# Patient Record
Sex: FEMALE | Race: BLACK OR AFRICAN AMERICAN | NOT HISPANIC OR LATINO | Employment: FULL TIME | URBAN - METROPOLITAN AREA
[De-identification: names, ages, dates, MRNs, and addresses within clinical notes are randomized per-mention and may not be internally consistent; named-entity substitution may affect disease eponyms.]

---
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
## 2018-01-16 ENCOUNTER — HOSPITAL ENCOUNTER (EMERGENCY)
Facility: OTHER | Age: 18
Discharge: HOME | End: 2018-01-16
Attending: EMERGENCY MEDICINE
Payer: MEDICAID

## 2018-01-16 VITALS
SYSTOLIC BLOOD PRESSURE: 122 MMHG | HEIGHT: 62 IN | RESPIRATION RATE: 18 BRPM | HEART RATE: 88 BPM | OXYGEN SATURATION: 100 % | DIASTOLIC BLOOD PRESSURE: 62 MMHG | TEMPERATURE: 99 F | BODY MASS INDEX: 25.21 KG/M2 | WEIGHT: 137 LBS

## 2018-01-16 DIAGNOSIS — R52 PAIN: ICD-10-CM

## 2018-01-16 DIAGNOSIS — S90.112A CONTUSION OF LEFT GREAT TOE WITHOUT DAMAGE TO NAIL, INITIAL ENCOUNTER: Primary | ICD-10-CM

## 2018-01-16 LAB
B-HCG UR QL: NEGATIVE
CTP QC/QA: YES

## 2018-01-16 PROCEDURE — 81025 URINE PREGNANCY TEST: CPT | Performed by: EMERGENCY MEDICINE

## 2018-01-16 PROCEDURE — 25000003 PHARM REV CODE 250: Performed by: EMERGENCY MEDICINE

## 2018-01-16 PROCEDURE — 99284 EMERGENCY DEPT VISIT MOD MDM: CPT | Mod: 25

## 2018-01-16 RX ORDER — IBUPROFEN 600 MG/1
600 TABLET ORAL
Status: COMPLETED | OUTPATIENT
Start: 2018-01-16 | End: 2018-01-16

## 2018-01-16 RX ORDER — TRAMADOL HYDROCHLORIDE 50 MG/1
50 TABLET ORAL EVERY 8 HOURS PRN
Qty: 8 TABLET | Refills: 0 | Status: SHIPPED | OUTPATIENT
Start: 2018-01-16 | End: 2018-01-26

## 2018-01-16 RX ADMIN — IBUPROFEN 600 MG: 600 TABLET, FILM COATED ORAL at 01:01

## 2018-01-16 NOTE — ED PROVIDER NOTES
"Encounter Date: 1/16/2018       History     Chief Complaint   Patient presents with    Toe Pain      Pt states, " I spang my big toe two days ago."     A 18-year-old female who presents to the ED with complaints of left great toe pain.  Patient states she hit her left great toe on the bed 2-3 days ago.  Patient concerned about increasing pain in bruising.  Patient has been taken ibuprofen 200 mg.  Patient takes she is being tested for renal insufficiency.      The history is provided by the patient.   Toe Pain   This is a new problem. The current episode started more than 2 days ago. The problem has been gradually worsening. Pertinent negatives include no chest pain and no shortness of breath. The symptoms are aggravated by walking. The symptoms are relieved by NSAIDs. The treatment provided no relief.     Review of patient's allergies indicates:  No Known Allergies  Past Medical History:   Diagnosis Date    Seasonal allergies      Past Surgical History:   Procedure Laterality Date    MIDDLE EAR SURGERY      RENAL BIOPSY      TYMPANOSTOMY TUBE PLACEMENT       History reviewed. No pertinent family history.  Social History   Substance Use Topics    Smoking status: Current Every Day Smoker     Types: Vaping with nicotine    Smokeless tobacco: Never Used    Alcohol use No     Review of Systems   Constitutional: Negative for fever.   HENT: Negative for sore throat.    Respiratory: Negative for shortness of breath.    Cardiovascular: Negative for chest pain.   Gastrointestinal: Negative for nausea.   Genitourinary: Negative for dysuria.   Musculoskeletal: Negative for back pain.        Left great toe pain   Skin: Negative for rash.   Neurological: Negative for weakness.   Hematological: Does not bruise/bleed easily.   All other systems reviewed and are negative.      Physical Exam     Initial Vitals [01/16/18 1228]   BP Pulse Resp Temp SpO2   (!) 113/54 82 16 98.2 °F (36.8 °C) 100 %      MAP       73.67     "     Physical Exam    Nursing note and vitals reviewed.  Constitutional: Vital signs are normal. She appears well-developed. She is cooperative. She does not appear ill.   HENT:   Head: Normocephalic and atraumatic.   Right Ear: External ear normal.   Left Ear: External ear normal.   Nose: Nose normal.   Mouth/Throat: Oropharynx is clear and moist.   Eyes: Conjunctivae and lids are normal. Pupils are equal, round, and reactive to light.   Neck: Normal range of motion. Neck supple.   Cardiovascular: Normal rate, regular rhythm, normal heart sounds and intact distal pulses.   Pulmonary/Chest: Effort normal and breath sounds normal.   Abdominal: Soft. Normal appearance and bowel sounds are normal. There is no tenderness.   Musculoskeletal:        Left foot: There is decreased range of motion and tenderness. There is no bony tenderness.        Feet:    Neurological: She is alert and oriented to person, place, and time.   Skin: Skin is warm, dry and intact. Capillary refill takes less than 2 seconds. No rash noted.   Psychiatric: She has a normal mood and affect. Her behavior is normal. Judgment and thought content normal. Cognition and memory are normal.         ED Course   Procedures  Labs Reviewed   POCT URINE PREGNANCY        Imaging Results          X-Ray Foot Complete Left (Final result)  Result time 01/16/18 13:27:34    Final result by Valdo Julien MD (01/16/18 13:27:34)                 Impression:        As above.      Electronically signed by: VALDO JULIEN MD  Date:     01/16/18  Time:    13:27              Narrative:    History: Foot pain.    Procedure: left foot 3 views    Findings     There are no acute fractures or dislocations. Soft tissues are unremarkable. No radiopaque foreign bodies in the soft tissues.                                  Medical Decision Making:   Initial Assessment:   A 18-year-old female who presents to the ED with complaints of left great toe pain.  Patient states she hit her toe on  the bed 2-3 days ago.  Patient reports increased pain with bruising.  Differential Diagnosis:   Toe contusion  Toe fracture   Clinical Tests:   Radiological Study: Ordered and Reviewed  ED Management:  Physical exam.  Patient medicated with Motrin.  Patient placed in a postop shoe.  Patient to be discharged home with tramadol when necessary.  Tylenol when necessary pain.  Patient to follow-up with PCP in 2-3 days.  Return to ED if symptoms worsen.                   ED Course      Clinical Impression:   The primary encounter diagnosis was Contusion of left great toe without damage to nail, initial encounter. A diagnosis of Pain was also pertinent to this visit.                           LEONA Barragan  01/16/18 1444       LEONA Barragan  01/16/18 1443

## 2020-04-23 ENCOUNTER — HOSPITAL ENCOUNTER (EMERGENCY)
Facility: HOSPITAL | Age: 20
Discharge: HOME | End: 2020-04-23
Attending: EMERGENCY MEDICINE
Payer: MEDICAID

## 2020-04-23 VITALS
HEART RATE: 68 BPM | BODY MASS INDEX: 23.55 KG/M2 | DIASTOLIC BLOOD PRESSURE: 73 MMHG | RESPIRATION RATE: 16 BRPM | TEMPERATURE: 98 F | SYSTOLIC BLOOD PRESSURE: 112 MMHG | OXYGEN SATURATION: 100 % | WEIGHT: 128 LBS | HEIGHT: 62 IN

## 2020-04-23 DIAGNOSIS — N89.8 VAGINAL DISCHARGE: ICD-10-CM

## 2020-04-23 DIAGNOSIS — Z20.2 POSSIBLE EXPOSURE TO STD: Primary | ICD-10-CM

## 2020-04-23 LAB
B-HCG UR QL: NEGATIVE
BILIRUBIN, POC UA: ABNORMAL
BLOOD, POC UA: ABNORMAL
CLARITY, POC UA: ABNORMAL
COLOR, POC UA: ABNORMAL
CTP QC/QA: YES
GLUCOSE, POC UA: NEGATIVE
KETONES, POC UA: ABNORMAL
LEUKOCYTE EST, POC UA: NEGATIVE
NITRITE, POC UA: NEGATIVE
PH UR STRIP: 6 [PH]
PROTEIN, POC UA: ABNORMAL
SPECIFIC GRAVITY, POC UA: >=1.03
UROBILINOGEN, POC UA: 0.2 E.U./DL

## 2020-04-23 PROCEDURE — 63700000 PHARM REV CODE 250 ALT 637 W/O HCPCS: Mod: ER | Performed by: NURSE PRACTITIONER

## 2020-04-23 PROCEDURE — 81003 URINALYSIS AUTO W/O SCOPE: CPT | Mod: ER

## 2020-04-23 PROCEDURE — 96372 THER/PROPH/DIAG INJ SC/IM: CPT | Mod: ER

## 2020-04-23 PROCEDURE — 87491 CHLMYD TRACH DNA AMP PROBE: CPT | Mod: 59

## 2020-04-23 PROCEDURE — 87801 DETECT AGNT MULT DNA AMPLI: CPT

## 2020-04-23 PROCEDURE — 25000003 PHARM REV CODE 250: Mod: ER | Performed by: NURSE PRACTITIONER

## 2020-04-23 PROCEDURE — 99284 EMERGENCY DEPT VISIT MOD MDM: CPT | Mod: 25,ER

## 2020-04-23 PROCEDURE — 87481 CANDIDA DNA AMP PROBE: CPT | Mod: 59

## 2020-04-23 PROCEDURE — 81025 URINE PREGNANCY TEST: CPT | Mod: ER | Performed by: NURSE PRACTITIONER

## 2020-04-23 PROCEDURE — 63600175 PHARM REV CODE 636 W HCPCS: Mod: ER | Performed by: NURSE PRACTITIONER

## 2020-04-23 RX ORDER — ONDANSETRON 4 MG/1
4 TABLET, ORALLY DISINTEGRATING ORAL
Status: COMPLETED | OUTPATIENT
Start: 2020-04-23 | End: 2020-04-23

## 2020-04-23 RX ORDER — METRONIDAZOLE 500 MG/1
500 TABLET ORAL 2 TIMES DAILY
Qty: 14 TABLET | Refills: 0 | Status: SHIPPED | OUTPATIENT
Start: 2020-04-23 | End: 2020-04-30

## 2020-04-23 RX ORDER — CEFTRIAXONE 250 MG/1
250 INJECTION, POWDER, FOR SOLUTION INTRAMUSCULAR; INTRAVENOUS
Status: COMPLETED | OUTPATIENT
Start: 2020-04-23 | End: 2020-04-23

## 2020-04-23 RX ORDER — AZITHROMYCIN 250 MG/1
1000 TABLET, FILM COATED ORAL
Status: COMPLETED | OUTPATIENT
Start: 2020-04-23 | End: 2020-04-23

## 2020-04-23 RX ORDER — ONDANSETRON 4 MG/1
4 TABLET, ORALLY DISINTEGRATING ORAL EVERY 8 HOURS PRN
Qty: 20 TABLET | Refills: 0 | OUTPATIENT
Start: 2020-04-23 | End: 2021-12-31

## 2020-04-23 RX ADMIN — AZITHROMYCIN MONOHYDRATE 1000 MG: 250 TABLET ORAL at 03:04

## 2020-04-23 RX ADMIN — CEFTRIAXONE SODIUM 250 MG: 250 INJECTION, POWDER, FOR SOLUTION INTRAMUSCULAR; INTRAVENOUS at 03:04

## 2020-04-23 RX ADMIN — ONDANSETRON 4 MG: 4 TABLET, ORALLY DISINTEGRATING ORAL at 03:04

## 2020-04-23 NOTE — ED PROVIDER NOTES
Encounter Date: 4/23/2020    SCRIBE #1 NOTE: I, Colin Felix, am scribing for, and in the presence of,  LEONA Hauser. I have scribed the following portions of the note - Other sections scribed: HPI, ROS, PE.       History     Chief Complaint   Patient presents with    Exposure to STD     states she gets tested frequently but has been unable to get in to get tested. Denies any symptoms      20 year old female presents to the ED with possible STD exposure. She reports that she recently had unprotected sex. States she usually gets tested frequently, but has been unable to recently due to her physician's office being closed. Patient denies fever, fatigue, chest pain, shortness of breath, abdominal pain, nausea, vomiting, diarrhea, dysuria, hematuria, rash, numbness, weakness, tingling, or any additional complaints.  She denies pain at present and has not tried any medications for her symptoms.      The history is provided by the patient. No  was used.     Review of patient's allergies indicates:  No Known Allergies  Past Medical History:   Diagnosis Date    Seasonal allergies      Past Surgical History:   Procedure Laterality Date    MIDDLE EAR SURGERY      RENAL BIOPSY      TYMPANOSTOMY TUBE PLACEMENT       No family history on file.  Social History     Tobacco Use    Smoking status: Current Every Day Smoker     Types: Vaping with nicotine    Smokeless tobacco: Never Used   Substance Use Topics    Alcohol use: No    Drug use: Not on file     Review of Systems   Constitutional: Negative for chills, fatigue and fever.   HENT: Negative for congestion, ear pain, rhinorrhea, sore throat and trouble swallowing.    Eyes: Negative for pain, discharge and redness.   Respiratory: Negative for cough and shortness of breath.    Cardiovascular: Negative for chest pain.   Gastrointestinal: Negative for abdominal pain, diarrhea, nausea and vomiting.   Genitourinary: Negative for decreased urine volume,  dysuria and hematuria.   Musculoskeletal: Negative for back pain, neck pain and neck stiffness.   Skin: Negative for rash.   Neurological: Negative for dizziness, weakness, light-headedness, numbness and headaches.   Psychiatric/Behavioral: Negative for confusion.       Physical Exam     Initial Vitals [04/23/20 1440]   BP Pulse Resp Temp SpO2   117/77 (!) 114 16 98.7 °F (37.1 °C) 100 %      MAP       --         Physical Exam    Nursing note and vitals reviewed.  Constitutional: Vital signs are normal. She appears well-developed and well-nourished.  Non-toxic appearance. She does not appear ill.   HENT:   Head: Normocephalic and atraumatic.   Right Ear: Tympanic membrane and external ear normal.   Left Ear: Tympanic membrane and external ear normal.   Nose: Nose normal.   Mouth/Throat: Uvula is midline, oropharynx is clear and moist and mucous membranes are normal.   Eyes: Conjunctivae are normal.   Neck: Normal range of motion. Neck supple.   Cardiovascular: Normal rate, regular rhythm and normal heart sounds. Exam reveals no gallop and no friction rub.    No murmur heard.  Pulmonary/Chest: Effort normal and breath sounds normal. No respiratory distress. She has no wheezes. She has no rhonchi. She has no rales. She exhibits no tenderness.   Abdominal: Soft. Bowel sounds are normal. There is no tenderness. There is no rebound, no guarding and no CVA tenderness.   Genitourinary: Pelvic exam was performed with patient supine. There is no rash or tenderness on the right labia. There is no rash or tenderness on the left labia. Cervix exhibits motion tenderness and discharge. Right adnexum displays no mass and no tenderness. Left adnexum displays no mass and no tenderness. Vaginal discharge found.   Genitourinary Comments: Scant discharge in the cervix and vaginal vault with CMT, no adnexal mass or tenderness, no rash or lesions   Musculoskeletal: Normal range of motion.   Neurological: She is alert and oriented to  person, place, and time. Gait normal. GCS eye subscore is 4. GCS verbal subscore is 5. GCS motor subscore is 6.   Skin: Skin is warm, dry and intact. No rash noted.   Psychiatric: She has a normal mood and affect. Her speech is normal and behavior is normal. Judgment and thought content normal.         ED Course   Procedures  Labs Reviewed   POCT URINALYSIS W/O SCOPE - Abnormal; Notable for the following components:       Result Value    Bilirubin, UA 1+ (*)     Ketones, UA Trace (*)     Spec Grav UA >=1.030 (*)     Blood, UA 2+ (*)     Protein, UA 3+ (*)     All other components within normal limits   C. TRACHOMATIS/N. GONORRHOEAE BY AMP DNA   VAGINOSIS SCREEN BY DNA PROBE   POCT URINE PREGNANCY   POCT URINALYSIS W/O SCOPE          Imaging Results    None          Medical Decision Making:   History:   Old Medical Records: I decided to obtain old medical records.  Clinical Tests:   Lab Tests: Ordered and Reviewed  The following lab test(s) were unremarkable: UPT       APC / Resident Notes:   This is an evaluation of a 20 y.o. female that presents to the Emergency Department for STD exposure    Physical Exam shows a non-toxic, afebrile, and well appearing female. Scant discharge in the cervix and vaginal vault with CMT, no adnexal mass or tenderness, no rash or lesions    Vital signs are reassuring. If available, previous records reviewed.   RESULTS: UPT negative, UA negative for infection, GC/CT pending, Vaginal screen pending    My overall impression is STD exposure. I considered, but at this time, do not suspect pregnancy, ectopic pregnancy, trich, TOA, torsion.    ED Course: UA, UPT, GC/CT, vaginal screen, rocephin, azithromycin, zofran. D/C Meds: Flagyl, zofran. D/C Information: f/u, medications. The diagnosis, treatment plan, instructions for follow-up and reevaluation with PCP as well as ED return precautions were discussed and understanding was verbalized. All questions or concerns have been addressed.             Scribe Attestation:   Scribe #1: I performed the above scribed service and the documentation accurately describes the services I performed. I attest to the accuracy of the note.    Physician Attestation for Scribe:  Physician Attestation Statement for Scribe: I, LEONA Hauser, reviewed documentation, as scribed by Colin Felix in my presence, and it is both accurate and complete.                      Clinical Impression:     1. Possible exposure to STD    2. Vaginal discharge      Disposition:   Disposition: Discharged  Condition: Stable                        LEONA Carranza  04/23/20 1527

## 2020-04-24 LAB
C TRACH DNA SPEC QL NAA+PROBE: DETECTED
N GONORRHOEA DNA SPEC QL NAA+PROBE: NOT DETECTED

## 2020-04-27 LAB
BACTERIAL VAGINOSIS DNA: POSITIVE
CANDIDA GLABRATA DNA: NEGATIVE
CANDIDA KRUSEI DNA: NEGATIVE
CANDIDA RRNA VAG QL PROBE: NEGATIVE
T VAGINALIS RRNA GENITAL QL PROBE: NEGATIVE

## 2021-05-13 ENCOUNTER — HOSPITAL ENCOUNTER (EMERGENCY)
Facility: HOSPITAL | Age: 21
Discharge: HOME | End: 2021-05-13
Attending: INTERNAL MEDICINE
Payer: MEDICAID

## 2021-05-13 VITALS
BODY MASS INDEX: 24.45 KG/M2 | HEIGHT: 63 IN | WEIGHT: 138 LBS | RESPIRATION RATE: 18 BRPM | SYSTOLIC BLOOD PRESSURE: 121 MMHG | OXYGEN SATURATION: 99 % | TEMPERATURE: 99 F | DIASTOLIC BLOOD PRESSURE: 74 MMHG | HEART RATE: 75 BPM
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
## 2021-05-13 LAB
B-HCG UR QL: NEGATIVE
CTP QC/QA: YES

## 2021-05-13 PROCEDURE — 99284 EMERGENCY DEPT VISIT MOD MDM: CPT | Mod: 25,ER

## 2021-05-13 PROCEDURE — 25000003 PHARM REV CODE 250: Mod: ER | Performed by: INTERNAL MEDICINE

## 2021-05-13 PROCEDURE — 81025 URINE PREGNANCY TEST: CPT | Mod: ER | Performed by: INTERNAL MEDICINE

## 2021-05-13 RX ORDER — FAMOTIDINE 20 MG/1
20 TABLET, FILM COATED ORAL 2 TIMES DAILY
COMMUNITY
Start: 2021-02-28 | End: 2021-12-31

## 2021-05-13 RX ORDER — METHOCARBAMOL 750 MG/1
1500 TABLET, FILM COATED ORAL
Status: COMPLETED | OUTPATIENT
Start: 2021-05-13 | End: 2021-05-13

## 2021-05-13 RX ORDER — METHOCARBAMOL 750 MG/1
1500 TABLET, FILM COATED ORAL 3 TIMES DAILY
Qty: 30 TABLET | Refills: 0 | Status: SHIPPED | OUTPATIENT
Start: 2021-05-13 | End: 2021-05-13 | Stop reason: SDUPTHER

## 2021-05-13 RX ORDER — KETOROLAC TROMETHAMINE 30 MG/ML
60 INJECTION, SOLUTION INTRAMUSCULAR; INTRAVENOUS
Status: DISCONTINUED | OUTPATIENT
Start: 2021-05-13 | End: 2021-05-14 | Stop reason: HOSPADM

## 2021-05-13 RX ORDER — LOSARTAN POTASSIUM 50 MG/1
50 TABLET ORAL
COMMUNITY
Start: 2021-02-04

## 2021-05-13 RX ORDER — METHOCARBAMOL 750 MG/1
1500 TABLET, FILM COATED ORAL 3 TIMES DAILY
Qty: 30 TABLET | Refills: 0 | Status: SHIPPED | OUTPATIENT
Start: 2021-05-13 | End: 2021-05-18

## 2021-05-13 RX ORDER — IBUPROFEN 800 MG/1
800 TABLET ORAL EVERY 8 HOURS PRN
Qty: 30 TABLET | Refills: 0 | Status: SHIPPED | OUTPATIENT
Start: 2021-05-13 | End: 2021-05-13 | Stop reason: SINTOL

## 2021-05-13 RX ADMIN — METHOCARBAMOL 1500 MG: 750 TABLET ORAL at 10:05

## 2021-12-31 ENCOUNTER — HOSPITAL ENCOUNTER (EMERGENCY)
Facility: HOSPITAL | Age: 21
Discharge: HOME | End: 2021-12-31
Attending: EMERGENCY MEDICINE
Payer: MEDICAID

## 2021-12-31 VITALS
HEART RATE: 82 BPM | SYSTOLIC BLOOD PRESSURE: 129 MMHG | BODY MASS INDEX: 24.8 KG/M2 | HEIGHT: 63 IN | OXYGEN SATURATION: 99 % | RESPIRATION RATE: 18 BRPM | DIASTOLIC BLOOD PRESSURE: 68 MMHG | TEMPERATURE: 99 F | WEIGHT: 140 LBS

## 2021-12-31 DIAGNOSIS — U07.1 COVID-19: Primary | ICD-10-CM

## 2021-12-31 LAB
CTP QC/QA: YES
SARS-COV-2 RDRP RESP QL NAA+PROBE: POSITIVE

## 2021-12-31 PROCEDURE — U0002 COVID-19 LAB TEST NON-CDC: HCPCS | Performed by: PHYSICIAN ASSISTANT

## 2021-12-31 PROCEDURE — 99284 EMERGENCY DEPT VISIT MOD MDM: CPT | Mod: 25

## 2021-12-31 PROCEDURE — 25000003 PHARM REV CODE 250: Performed by: PHYSICIAN ASSISTANT

## 2021-12-31 RX ORDER — GUAIFENESIN/DEXTROMETHORPHAN 100-10MG/5
10 SYRUP ORAL 4 TIMES DAILY PRN
Qty: 120 ML | Refills: 0 | Status: SHIPPED | OUTPATIENT
Start: 2021-12-31 | End: 2022-01-10

## 2021-12-31 RX ORDER — ONDANSETRON 4 MG/1
4 TABLET, ORALLY DISINTEGRATING ORAL
Status: COMPLETED | OUTPATIENT
Start: 2021-12-31 | End: 2021-12-31

## 2021-12-31 RX ORDER — FAMOTIDINE 20 MG/1
20 TABLET, FILM COATED ORAL 2 TIMES DAILY
Qty: 28 TABLET | Refills: 0 | Status: SHIPPED | OUTPATIENT
Start: 2021-12-31 | End: 2022-01-14

## 2021-12-31 RX ORDER — ONDANSETRON 4 MG/1
4 TABLET, ORALLY DISINTEGRATING ORAL EVERY 6 HOURS PRN
Qty: 20 TABLET | Refills: 0 | Status: SHIPPED | OUTPATIENT
Start: 2021-12-31

## 2021-12-31 RX ADMIN — ONDANSETRON 4 MG: 4 TABLET, ORALLY DISINTEGRATING ORAL at 08:12

## 2021-12-31 NOTE — Clinical Note
"Doroteo ROMERO "Bharathi Bill was seen and treated in our emergency department on 12/31/2021.     COVID-19 is present in our communities across the state. There is limited testing for COVID at this time, so not all patients can be tested. In this situation, your employee meets the following criteria:    Doroteo Bill has met the criteria for COVID-19 testing and has a POSITIVE result. She can return to work once they are asymptomatic for 72 hours without the use of fever reducing medications AND at least ten days from the first positive result.     If you have any questions or concerns, or if I can be of further assistance, please do not hesitate to contact me.    Sincerely,             Timbo Antony PA-C"

## 2022-01-01 NOTE — DISCHARGE INSTRUCTIONS
Zofran to help with nausea.  Pepcid twice daily.  Drink lots of fluids, stay well hydrated. Drink lots of fluids, stay well hydrated. Tylenol (650mg) / Ibuprofen (600mg) as needed for discomfort; go back and forth between these two medications every 4 hrs as needed for temp greater than or equal to 100.4F, as needed for congestion/headache/body aches.  Robitussin for cough.    Follow-up with a local primary care provider for reevaluation, further recommendations. Return to this ED if unable to treat fever, if symptoms persist or worsen despite treatment, if you begin with shortness of breath or difficulty breathing, if any other problems occur.

## 2022-01-01 NOTE — ED PROVIDER NOTES
Encounter Date: 12/31/2021       History     Chief Complaint   Patient presents with    COVID-19 Concerns     COVID exposure at home, c/o headaches with nausea/vomiting and body aches x 2 days, pt had home COVID + test     22yo F with pmh HTN, eczema, proteinuria, chronic glomerulonephritis, chronic back pain, with chief complaint 5d hx headache, myalgias, fatigue, cough, generally feeling unwell, began with n/v x2-3d.  Anorexia times 2-3 days.  No fever today.  Admits to positive COVID home test.  Mild generalized abdominal pain.  No urinary complaints.  Symptoms are acute, constant, mild to moderate.  No alleviating factors.  No radiation of symptoms.  No shortness of breath.        Review of patient's allergies indicates:   Allergen Reactions    Aspirin Other (See Comments)     Reports medication makes her weak and light headed    Ibuprofen Other (See Comments)     Reports medication makes her weak and light headed     Tylenol [acetaminophen] Other (See Comments)     Reports medication makes her weak and light headed      Past Medical History:   Diagnosis Date    Hypertension     Proteinuria     Renal disorder     Seasonal allergies      Past Surgical History:   Procedure Laterality Date    MIDDLE EAR SURGERY      RENAL BIOPSY      TYMPANOSTOMY TUBE PLACEMENT       No family history on file.  Social History     Tobacco Use    Smoking status: Current Every Day Smoker     Types: Vaping with nicotine    Smokeless tobacco: Never Used   Substance Use Topics    Alcohol use: No     Review of Systems   Constitutional: Positive for appetite change, fatigue and fever.   Respiratory: Positive for cough. Negative for shortness of breath.    Gastrointestinal: Positive for abdominal pain, nausea and vomiting.   Genitourinary: Negative for dysuria and flank pain.   Musculoskeletal: Positive for myalgias. Negative for neck pain and neck stiffness.       Physical Exam     Initial Vitals [12/31/21 1907]   BP Pulse  Resp Temp SpO2   123/82 78 16 98.6 °F (37 °C) 99 %      MAP       --         Physical Exam    Nursing note and vitals reviewed.  Constitutional: She appears well-developed and well-nourished. She is not diaphoretic. No distress.   Uncomfortable appearing, nontoxic.  Sitting upright in wheelchair.   HENT:   Head: Normocephalic and atraumatic.   Neck: Neck supple.   Normal range of motion.  Pulmonary/Chest: No respiratory distress.   Musculoskeletal:      Cervical back: Normal range of motion and neck supple.     Neurological: She is alert and oriented to person, place, and time. GCS score is 15. GCS eye subscore is 4. GCS verbal subscore is 5. GCS motor subscore is 6.   Skin: Skin is warm.   Psychiatric: Thought content normal.   Anxious         ED Course   Procedures  Labs Reviewed   SARS-COV-2 RDRP GENE - Abnormal; Notable for the following components:       Result Value    POC Rapid COVID Positive (*)     All other components within normal limits   POCT URINE PREGNANCY          Imaging Results    None          Medications   ondansetron disintegrating tablet 4 mg (has no administration in time range)     Medical Decision Making:   Differential Diagnosis:   Viral illness, pneumonia, gastroenteritis, GERD, gastritis  ED Management:  Will treat supportively with antibiotics, antitussives, advised lots of fluids, follow-up with PCP for re-evaluation should symptoms persist.  Return precautions given.                      Clinical Impression:   Final diagnoses:  [U07.1] COVID-19 (Primary)          ED Disposition Condition    Discharge Stable        ED Prescriptions     Medication Sig Dispense Start Date End Date Auth. Provider    ondansetron (ZOFRAN-ODT) 4 MG TbDL Take 1 tablet (4 mg total) by mouth every 6 (six) hours as needed (Nausea). 20 tablet 12/31/2021  Timbo Antony PA-C    dextromethorphan-guaiFENesin  mg/5 ml (ROBITUSSIN-DM)  mg/5 mL liquid Take 10 mLs by mouth 4 (four) times daily as needed  (Cough). 120 mL 12/31/2021 1/10/2022 Timbo Antony PA-C    famotidine (PEPCID) 20 MG tablet Take 1 tablet (20 mg total) by mouth 2 (two) times daily. for 14 days 28 tablet 12/31/2021 1/14/2022 Timbo Antony PA-C        Follow-up Information     Follow up With Specialties Details Why Contact Info    [facility]  Schedule an appointment as soon as possible for a visit  To establish primary care physician, for reevaluation [address] [place] [address]  [phone]      [facility]  Schedule an appointment as soon as possible for a visit  To establish primary care physician, For reevaluation [address] [facility] [address]  [phone]             Timbo Antony PA-C  12/31/21 1959

## 2022-12-08 ENCOUNTER — HOSPITAL ENCOUNTER (EMERGENCY)
Facility: HOSPITAL | Age: 22
Discharge: HOME | End: 2022-12-08
Attending: EMERGENCY MEDICINE
Payer: MEDICAID

## 2022-12-08 VITALS
SYSTOLIC BLOOD PRESSURE: 117 MMHG | TEMPERATURE: 99 F | WEIGHT: 140 LBS | HEART RATE: 105 BPM | HEIGHT: 63 IN | BODY MASS INDEX: 24.8 KG/M2 | OXYGEN SATURATION: 100 % | DIASTOLIC BLOOD PRESSURE: 75 MMHG | RESPIRATION RATE: 20 BRPM

## 2022-12-08 DIAGNOSIS — Z32.02 PREGNANCY TEST NEGATIVE: Primary | ICD-10-CM

## 2022-12-08 LAB
B-HCG UR QL: NEGATIVE
CTP QC/QA: YES

## 2022-12-08 PROCEDURE — 99282 EMERGENCY DEPT VISIT SF MDM: CPT | Mod: 25,ER

## 2022-12-08 PROCEDURE — 81025 URINE PREGNANCY TEST: CPT | Mod: ER | Performed by: EMERGENCY MEDICINE

## 2022-12-08 NOTE — ED PROVIDER NOTES
"Encounter Date: 12/8/2022    SCRIBE #1 NOTE: I, Jefferson Robertson, am scribing for, and in the presence of,  Tee Kramer DNP.   SCRIBE #2 NOTE: I, Mundo Walters, am scribing for, and in the presence of,  Tee Kramer DNP. I have scribed the remaining portions of the note not scribed by Scribe #1.   History     Chief Complaint   Patient presents with    Possible Pregnancy     Wants preg test, positive home test     Doroteo Bill is a 22 y.o. female, with a past medical history of HTN, who presents to the ED with possible pregnancy. She took an at-home pregnancy test, which was positive, but she states that she wants to "make sure".  Patient further notes increased urinary frequency and nausea. Pt notes no past pregnancy. Denies vomiting, dysuria, hematuria, vaginal discharge, vaginal bleeding, or other associated symptoms. LMP 11/3.     The history is provided by the patient. No  was used.   Review of patient's allergies indicates:   Allergen Reactions    Aspirin Other (See Comments)     Reports medication makes her weak and light headed    Ibuprofen Other (See Comments)     Reports medication makes her weak and light headed     Tylenol [acetaminophen] Other (See Comments)     Reports medication makes her weak and light headed      Past Medical History:   Diagnosis Date    Hypertension     Proteinuria     Renal disorder     Seasonal allergies      Past Surgical History:   Procedure Laterality Date    MIDDLE EAR SURGERY      RENAL BIOPSY      TYMPANOSTOMY TUBE PLACEMENT       No family history on file.  Social History     Tobacco Use    Smoking status: Every Day     Types: Vaping with nicotine    Smokeless tobacco: Never   Substance Use Topics    Alcohol use: No     Review of Systems   Constitutional:  Negative for chills, fatigue and fever.   HENT:  Negative for congestion, ear discharge, ear pain, postnasal drip, rhinorrhea, sinus pressure, sneezing, sore throat and voice change.    Eyes:  " Negative for discharge and itching.   Respiratory:  Negative for cough, shortness of breath and wheezing.    Cardiovascular:  Negative for chest pain, palpitations and leg swelling.   Gastrointestinal:  Positive for nausea. Negative for abdominal pain, constipation, diarrhea and vomiting.   Endocrine: Negative for polydipsia, polyphagia and polyuria.   Genitourinary:  Positive for frequency. Negative for dysuria, hematuria, urgency, vaginal bleeding, vaginal discharge and vaginal pain.   Musculoskeletal:  Negative for arthralgias and myalgias.   Skin:  Negative for rash and wound.   Neurological:  Negative for dizziness, seizures, syncope, weakness and numbness.   Hematological:  Negative for adenopathy. Does not bruise/bleed easily.   Psychiatric/Behavioral:  Negative for self-injury and suicidal ideas. The patient is not nervous/anxious.      Physical Exam     Initial Vitals [12/08/22 0954]   BP Pulse Resp Temp SpO2   117/75 (!) 112 20 98.6 °F (37 °C) 100 %      MAP       --         Physical Exam    Nursing note and vitals reviewed.  Constitutional: She appears well-developed and well-nourished.   HENT:   Head: Normocephalic and atraumatic.   Right Ear: External ear normal.   Left Ear: External ear normal.   Nose: Nose normal.   Eyes: Conjunctivae and EOM are normal. Pupils are equal, round, and reactive to light. Right eye exhibits no discharge. Left eye exhibits no discharge.   Neck:   Normal range of motion.  Abdominal: She exhibits no distension.   Musculoskeletal:         General: Normal range of motion.      Cervical back: Normal range of motion.     Neurological: She is alert and oriented to person, place, and time.   Skin: Skin is dry. Capillary refill takes less than 2 seconds.       ED Course   Procedures  Labs Reviewed   POCT URINE PREGNANCY          Imaging Results    None          Medications - No data to display  Medical Decision Making:   History:   Old Medical Records: I decided to obtain old  medical records.  Initial Assessment:   22 y.o. female presents to the ER for possible pregnancy. Patient has a history of HTN, and reports nausea and urinary frequency with positive pregnancy test at home. Physical examination was unremarkable. UPT ordered.   Differential Diagnosis:   Includes but is not limited to Pregnancy, UTI.  Clinical Tests:   Lab Tests: Ordered and Reviewed        Scribe Attestation:   Scribe #1: I performed the above scribed service and the documentation accurately describes the services I performed. I attest to the accuracy of the note.  Scribe #2: I performed the above scribed service and the documentation accurately describes the services I performed. I attest to the accuracy of the note.      ED Course as of 12/08/22 2008   Thu Dec 08, 2022   0958 BP: 117/75 [VC]   0958 Temp: 98.6 °F (37 °C) [VC]   0958 Temp src: Oral [VC]   0958 Pulse(!): 112 [VC]   0958 Resp: 20 [VC]   0958 SpO2: 100 % [VC]      ED Course User Index  [VC] Tee Kramer DNP          UPT was negative.  The patient's relative tachycardia at decreased to 105.  I feel this was likely secondary to anxiety over test results.  She was in no apparent distress and voice readiness for discharge.       Clinical Impression:   Final diagnoses:  [Z32.02] Pregnancy test negative (Primary)        ED Prescriptions    None       Follow-up Information       Follow up With Specialties Details Why Contact Info    [facility]  Schedule an appointment as soon as possible for a visit   [address] [place] [address]  [phone]            Scribe attestation: Scribe attestation: Tee CALDWELL DNP ACNP-BC FNP-C ENP-C,  personally performed the services described in this documentation. All medical record entries made by the scribe were at my direction and in my presence.  I have reviewed the chart and agree that the record reflects my personal performance and is accurate and complete.     Vital signs at the  "time of disposition were:  /75   Pulse 105   Temp 98.6 °F (37 °C) (Oral)   Resp 20   Ht 5' 3" (1.6 m)   Wt 63.5 kg (140 lb)   LMP  (LMP Unknown) Comment: unk last cycle  SpO2 100%   BMI 24.80 kg/m²       See AVS for additional recommendations. Medications listed herein were prescribed after reviewing the patient's allergies, medication list, history, most recent laboratories as available.  Referrals below were provided after reviewing the patient's previous medical providers. She understands she  should return for any worsening or changes in condition.  Prior to discharge the patient was asked if she  had any additional concerns or complaints and she declined. The patient was given an opportunity to ask questions and all were answered to her satisfaction.       Tee Kramer, AMIRAH  12/08/22 2008    "

## 2022-12-08 NOTE — DISCHARGE INSTRUCTIONS
The emergency department should be reserved for new or emergent medical crises.  For nonemergent medical conditions or queries make an appointment with your primary care provider or urgent care.    Return to the Emergency Department for any worsening, change in condition, or any emergent concerns.

## 2022-12-08 NOTE — Clinical Note
"Doroteo Mortensen" Landy was seen and treated in our emergency department on 12/8/2022.  She may return to work on 12/08/2022.       If you have any questions or concerns, please don't hesitate to call.      LANIE DAY"

## 2024-01-29 ENCOUNTER — HOSPITAL ENCOUNTER (EMERGENCY)
Facility: HOSPITAL | Age: 24
Discharge: HOME | End: 2024-01-30
Attending: STUDENT IN AN ORGANIZED HEALTH CARE EDUCATION/TRAINING PROGRAM
Payer: MEDICAID

## 2024-01-29 DIAGNOSIS — S61.213A LACERATION OF LEFT MIDDLE FINGER WITHOUT FOREIGN BODY WITHOUT DAMAGE TO NAIL, INITIAL ENCOUNTER: Primary | ICD-10-CM

## 2024-01-29 LAB
B-HCG UR QL: NEGATIVE
CTP QC/QA: YES

## 2024-01-29 PROCEDURE — 81025 URINE PREGNANCY TEST: CPT | Mod: ER | Performed by: STUDENT IN AN ORGANIZED HEALTH CARE EDUCATION/TRAINING PROGRAM

## 2024-01-29 PROCEDURE — 12002 RPR S/N/AX/GEN/TRNK2.6-7.5CM: CPT | Mod: F2,ER

## 2024-01-29 PROCEDURE — 99284 EMERGENCY DEPT VISIT MOD MDM: CPT | Mod: ER

## 2024-01-29 PROCEDURE — 25000003 PHARM REV CODE 250: Mod: ER

## 2024-01-29 RX ORDER — LIDOCAINE HYDROCHLORIDE 10 MG/ML
10 INJECTION INFILTRATION; PERINEURAL
Status: COMPLETED | OUTPATIENT
Start: 2024-01-29 | End: 2024-01-29

## 2024-01-29 RX ADMIN — LIDOCAINE HYDROCHLORIDE 10 ML: 10 INJECTION, SOLUTION INFILTRATION; PERINEURAL at 11:01

## 2024-01-29 RX ADMIN — TETANUS TOXOID, REDUCED DIPHTHERIA TOXOID AND ACELLULAR PERTUSSIS VACCINE, ADSORBED 0.5 ML: 5; 2.5; 8; 8; 2.5 SUSPENSION INTRAMUSCULAR at 11:01

## 2024-01-29 NOTE — Clinical Note
"Doroteo Mortensen" Landy was seen and treated in our emergency department on 1/29/2024.  She may return to school on 02/01/2024.      If you have any questions or concerns, please don't hesitate to call.      RAYMOND Holloway RN"

## 2024-01-30 VITALS
WEIGHT: 152 LBS | TEMPERATURE: 98 F | BODY MASS INDEX: 26.93 KG/M2 | HEIGHT: 63 IN | RESPIRATION RATE: 20 BRPM | OXYGEN SATURATION: 100 % | HEART RATE: 88 BPM | DIASTOLIC BLOOD PRESSURE: 81 MMHG | SYSTOLIC BLOOD PRESSURE: 108 MMHG

## 2024-01-30 PROCEDURE — 90471 IMMUNIZATION ADMIN: CPT | Mod: ER

## 2024-01-30 PROCEDURE — 63600175 PHARM REV CODE 636 W HCPCS: Mod: ER

## 2024-01-30 PROCEDURE — 90715 TDAP VACCINE 7 YRS/> IM: CPT | Mod: ER

## 2024-01-30 RX ORDER — MUPIROCIN 20 MG/G
OINTMENT TOPICAL 3 TIMES DAILY
Qty: 15 G | Refills: 0 | Status: SHIPPED | OUTPATIENT
Start: 2024-01-30

## 2024-01-30 NOTE — DISCHARGE INSTRUCTIONS
You were diagnosed with laceration here in the ER today. Please take all medications as prescribed for symptoms and follow up with your primary care provider.  Return to the ER in 1-2 weeks for suture removal.  Return to the ER for any new or worsening symptoms. It was a pleasure taking care of you today, I hope you feel better!    Thank you for coming to our Emergency Department today. It is important to remember that some problems are difficult to diagnose and may not be found during your Emergency Department visit. Be sure to follow up with your primary care doctor and review all labs/imaging/tests that were performed during this visit with them. Some labs/tests may be outside of the normal range and require non-emergent follow-up and further investigation to help diagnose/exclude/prevent complications or other medical conditions.    If you do not have a primary care doctor, you may contact the one listed on your discharge paperwork or you may also call the [facility] Appointment Desk at [phone] to schedule an appointment and establish care with one. It is important to your health that you have a primary care doctor.    Please take all medications as directed. All medications may potentially have side-effects and it is impossible to predict which medications may give you side-effects or what side-effects (if any) they will give you.. If you feel that you are having a negative effect or side-effect of any medication you should immediately stop taking them and seek medical attention. If you feel that you are having a life-threatening reaction call 911.    Return to the ER with any questions/concerns, new/concerning symptoms, worsening or failure to improve.     Do not drive, swim, climb to height, take a bath or make any important decisions for 24 hours if you have received any pain medications, sedatives or mood altering drugs during your ER visit.

## 2024-01-30 NOTE — ED PROVIDER NOTES
Encounter Date: 1/29/2024       History     Chief Complaint   Patient presents with    Laceration     Laceration from kitchen knife to left middle finger. No active bleeding w/ dressing applied pta.      Patient is a 24-year-old female with a past medical history of hypertension who presents to the emergency department for evaluation of laceration to left middle right finger that occurred prior to arrival.  Patient reports she was prepping dinner and sliced her finger with a knife.  Denies known last tetanus vaccine.  Denies any drainage.  Reports bleeding is controlled.  Denies fever, chills.  Denies nausea or vomiting.  Denies numbness or tingling.  Denies headache, chest pain, shortness of breath, abdominal pain.      Review of patient's allergies indicates:   Allergen Reactions    Aspirin Other (See Comments)     Reports medication makes her weak and light headed    Ibuprofen Other (See Comments)     Reports medication makes her weak and light headed     Tylenol [acetaminophen] Other (See Comments)     Reports medication makes her weak and light headed      Past Medical History:   Diagnosis Date    Hypertension     Proteinuria     Renal disorder     Seasonal allergies      Past Surgical History:   Procedure Laterality Date    MIDDLE EAR SURGERY      RENAL BIOPSY      TYMPANOSTOMY TUBE PLACEMENT       History reviewed. No pertinent family history.  Social History     Tobacco Use    Smoking status: Every Day     Types: Vaping with nicotine    Smokeless tobacco: Never   Substance Use Topics    Alcohol use: No    Drug use: Never     Review of Systems   Constitutional:  Negative for chills and fever.   Respiratory:  Negative for shortness of breath.    Cardiovascular:  Negative for chest pain.   Gastrointestinal:  Negative for abdominal pain, nausea and vomiting.   Musculoskeletal:  Positive for arthralgias. Negative for joint swelling, neck pain and neck stiffness.   Skin:  Positive for wound. Negative for color  change.   Neurological:  Negative for dizziness, light-headedness, numbness and headaches.       Physical Exam     Initial Vitals [01/29/24 2316]   BP Pulse Resp Temp SpO2   122/89 82 16 98 °F (36.7 °C) 100 %      MAP       --         Physical Exam    Nursing note and vitals reviewed.  Constitutional: She appears well-developed and well-nourished.   HENT:   Head: Normocephalic and atraumatic.   Right Ear: External ear normal.   Left Ear: External ear normal.   Neck: Carotid bruit is not present.   Normal range of motion.  Cardiovascular:  Normal rate, regular rhythm, normal heart sounds and intact distal pulses.     Exam reveals no gallop and no friction rub.       No murmur heard.  Pulmonary/Chest: Breath sounds normal. No respiratory distress. She has no wheezes. She has no rhonchi. She has no rales.   Abdominal: Abdomen is soft. Bowel sounds are normal. She exhibits no distension. There is no abdominal tenderness. There is no rebound and no guarding.   Musculoskeletal:         General: Normal range of motion.      Cervical back: Normal range of motion.      Comments: There is normal range of motion of the left middle digit on the left hand.  There is an approximately 2-3 cm laceration that spares the nailbed.  No tendon injury.  Normal capillary refill.  Neurovascularly intact.     Neurological: She is alert and oriented to person, place, and time. GCS score is 15. GCS eye subscore is 4. GCS verbal subscore is 5. GCS motor subscore is 6.   Psychiatric: She has a normal mood and affect.         ED Course   Lac Repair    Date/Time: 1/29/2024 11:32 PM    Performed by: Oumar West PA-C  Authorized by: Nicolás Graf MD    Consent:     Consent obtained:  Verbal    Consent given by:  Patient    Risks discussed:  Infection, pain, poor cosmetic result and poor wound healing  Universal protocol:     Patient identity confirmed:  Verbally with patient  Anesthesia:     Anesthesia method:  Local infiltration     Local anesthetic:  Lidocaine 1% w/o epi  Laceration details:     Location:  Finger    Finger location:  L long finger    Length (cm):  3  Treatment:     Area cleansed with:  Povidone-iodine    Amount of cleaning:  Standard    Irrigation solution:  Sterile saline  Skin repair:     Repair method:  Sutures    Suture size:  5-0    Suture material:  Nylon    Suture technique:  Simple interrupted    Number of sutures:  4  Approximation:     Approximation:  Close  Repair type:     Repair type:  Simple  Post-procedure details:     Dressing:  Open (no dressing)    Procedure completion:  Tolerated    Labs Reviewed   POCT URINE PREGNANCY          Imaging Results    None          Medications   LIDOcaine HCL 10 mg/ml (1%) injection 10 mL (10 mLs Infiltration Given by Provider 1/29/24 7592)   Tdap (BOOSTRIX) vaccine injection 0.5 mL (0.5 mLs Intramuscular Given 1/29/24 5267)     Medical Decision Making  This is an emergent evaluation of a 24-year-old female with a past medical history of hypertension who presents to the emergency department for evaluation of laceration to left middle right finger that occurred prior to arrival.  Patient reports she was prepping dinner and sliced her finger with a knife.  Denies known last tetanus vaccine.  Denies any drainage.  Reports bleeding is controlled.  Denies fever, chills.  Denies nausea or vomiting.  Denies numbness or tingling.  Denies headache, chest pain, shortness of breath, abdominal pain.  On physical exam, patient is very well-appearing in no acute distress. There is normal range of motion of the left middle digit on the left hand.  There is an approximately 2-3 cm laceration that spares the nailbed.  No tendon injury.  Normal capillary refill.  Neurovascularly intact. Regular rate rhythm without murmurs.  No carotid bruits appreciated on exam. Lungs are clear to auscultation bilaterally.  Abdomen is soft, nontender, non distended, with normal bowel sounds.  Laceration repair was  performed.  4 sutures were placed.  Please see procedure note above for further details.  Patient tolerated procedure without difficulty.  I have educated the patient about proper wound care.  Instructed her to return to ED for suture removal in 1-2 weeks.  Will send home with topical mupirocin ointment to prevent secondary infection. Patient is very well appearing, and in no acute distress. Vital signs are reassuring here in the emergency department, patient is afebrile, breathing comfortable, satting 100 % on room air. Patient/Caregiver is stable for discharge at this time. Patient/Caregiver verbalize understanding of care plan. All questions and concerns were addressed. Discussed strict return precautions with the patient/caregiver. Instructed follow up with primary care provider within 1 week.      Oumar West PA-C    DISCLAIMER: This note was prepared with [employer] voice recognition transcription software. Garbled syntax, mangled pronouns, and other bizarre constructions may be attributed to that software system.     Oumar West PA-C    DISCLAIMER: This note was prepared with [employer] voice recognition transcription software. Garbled syntax, mangled pronouns, and other bizarre constructions may be attributed to that software system.       Amount and/or Complexity of Data Reviewed  Labs: ordered.    Risk  Prescription drug management.                                      Clinical Impression:  Final diagnoses:  [S62.213A] Laceration of left middle finger without foreign body without damage to nail, initial encounter (Primary)          ED Disposition Condition    Discharge Stable          ED Prescriptions       Medication Sig Dispense Start Date End Date Auth. Provider    mupirocin (BACTROBAN) 2 % ointment Apply topically 3 (three) times daily. 15 g 1/30/2024 -- Oumar West PA-C          Follow-up Information       Follow up With Specialties Details Why Contact Info    [facility] ED Emergency  Medicine Go to  As needed, If symptoms worsen, or new symptoms develop [address] [facility] [id number] [phone]             Oumar West PA-C  01/30/24 0003

## 2024-01-30 NOTE — ED TRIAGE NOTES
Pt arrived to the ED via personal transport due to laceration noted to left middle finger. Pt reports while cooking (cutting seasoning), she accidentally cut finger with kitchen knife. Pt reports non-stop bleeding since incident at 1300. Upon arrival to ED room, bleeding appears controlled. Last Tetanus shot unknown. Alert and oriented x4.

## 2024-02-07 ENCOUNTER — HOSPITAL ENCOUNTER (EMERGENCY)
Facility: HOSPITAL | Age: 24
Discharge: HOME | End: 2024-02-07
Attending: INTERNAL MEDICINE
Payer: MEDICAID

## 2024-02-07 VITALS
TEMPERATURE: 98 F | OXYGEN SATURATION: 100 % | SYSTOLIC BLOOD PRESSURE: 143 MMHG | HEIGHT: 63 IN | RESPIRATION RATE: 20 BRPM | DIASTOLIC BLOOD PRESSURE: 84 MMHG | WEIGHT: 152 LBS | HEART RATE: 87 BPM | BODY MASS INDEX: 26.93 KG/M2

## 2024-02-07 DIAGNOSIS — S61.211A LACERATION OF LEFT INDEX FINGER WITHOUT FOREIGN BODY WITHOUT DAMAGE TO NAIL, INITIAL ENCOUNTER: Primary | ICD-10-CM

## 2024-02-07 PROCEDURE — 99283 EMERGENCY DEPT VISIT LOW MDM: CPT | Mod: 25,ER

## 2024-02-07 PROCEDURE — 12001 RPR S/N/AX/GEN/TRNK 2.5CM/<: CPT | Mod: ER

## 2024-02-07 PROCEDURE — 25000003 PHARM REV CODE 250: Mod: ER

## 2024-02-07 RX ORDER — MUPIROCIN 20 MG/G
OINTMENT TOPICAL 3 TIMES DAILY
Qty: 15 G | Refills: 0 | Status: SHIPPED | OUTPATIENT
Start: 2024-02-07

## 2024-02-07 RX ORDER — LIDOCAINE HYDROCHLORIDE 10 MG/ML
10 INJECTION INFILTRATION; PERINEURAL
Status: COMPLETED | OUTPATIENT
Start: 2024-02-07 | End: 2024-02-07

## 2024-02-07 RX ADMIN — LIDOCAINE HYDROCHLORIDE 10 ML: 10 INJECTION, SOLUTION INFILTRATION; PERINEURAL at 10:02

## 2024-02-08 NOTE — ED NOTES
Pt presents with lac to left index finger after trying to catch a glass that was falling and it shattered. Pt wound irrigated at this time with NS, pt tolerated well. Bleeding controlled. Cms intact. Tetanus UTD.

## 2024-02-08 NOTE — DISCHARGE INSTRUCTIONS
You were diagnosed with laceration here in the ER today. Please take all medications as prescribed for symptoms and follow up with your primary care provider.  Return to the ER in 1-2 week for suture removal.  Return to the ER for any new or worsening symptoms. It was a pleasure taking care of you today, I hope you feel better!    Thank you for coming to our Emergency Department today. It is important to remember that some problems are difficult to diagnose and may not be found during your Emergency Department visit. Be sure to follow up with your primary care doctor and review all labs/imaging/tests that were performed during this visit with them. Some labs/tests may be outside of the normal range and require non-emergent follow-up and further investigation to help diagnose/exclude/prevent complications or other medical conditions.    If you do not have a primary care doctor, you may contact the one listed on your discharge paperwork or you may also call the [facility] Appointment Desk at [phone] to schedule an appointment and establish care with one. It is important to your health that you have a primary care doctor.    Please take all medications as directed. All medications may potentially have side-effects and it is impossible to predict which medications may give you side-effects or what side-effects (if any) they will give you.. If you feel that you are having a negative effect or side-effect of any medication you should immediately stop taking them and seek medical attention. If you feel that you are having a life-threatening reaction call 911.    Return to the ER with any questions/concerns, new/concerning symptoms, worsening or failure to improve.     Do not drive, swim, climb to height, take a bath or make any important decisions for 24 hours if you have received any pain medications, sedatives or mood altering drugs during your ER visit.

## 2024-02-08 NOTE — ED PROVIDER NOTES
Encounter Date: 2/7/2024       History     Chief Complaint   Patient presents with    Finger Injury     C/o left index finger injury at home. Pt reports glass broke at home and she attempted to catch it. Bleeding controlled with gauze.     Patient is a 24 y.o. female with no past medical history presents to the emergency department for evaluation of laceration to left index finger that occurred prior to arrival.  Patient report she was picking up broken glass. Denies fever, chills, vomiting, numbness, tingling or further complaints. Patient reports her tetanus immunization was updated about one week ago.       Review of patient's allergies indicates:   Allergen Reactions    Aspirin Other (See Comments)     Reports medication makes her weak and light headed    Ibuprofen Other (See Comments)     Reports medication makes her weak and light headed     Tylenol [acetaminophen] Other (See Comments)     Reports medication makes her weak and light headed      Past Medical History:   Diagnosis Date    Hypertension     Proteinuria     Renal disorder     Seasonal allergies      Past Surgical History:   Procedure Laterality Date    MIDDLE EAR SURGERY      RENAL BIOPSY      TYMPANOSTOMY TUBE PLACEMENT       History reviewed. No pertinent family history.  Social History     Tobacco Use    Smoking status: Every Day     Types: Vaping with nicotine    Smokeless tobacco: Never   Substance Use Topics    Alcohol use: No    Drug use: Never     Review of Systems   Constitutional:  Negative for chills and fever.   HENT:  Negative for sore throat.    Respiratory:  Negative for shortness of breath.    Cardiovascular:  Negative for chest pain.   Gastrointestinal:  Negative for nausea.   Genitourinary:  Negative for dysuria.   Musculoskeletal:  Negative for back pain.   Skin:  Positive for wound. Negative for rash.   Neurological:  Negative for weakness, numbness and headaches.   Hematological:  Does not bruise/bleed easily.       Physical  Exam     Initial Vitals [02/07/24 2110]   BP Pulse Resp Temp SpO2   (!) 143/84 87 20 98.2 °F (36.8 °C) 100 %      MAP       --         Physical Exam    Nursing note and vitals reviewed.  Constitutional: She appears well-developed and well-nourished.   HENT:   Head: Normocephalic and atraumatic.   Right Ear: External ear normal.   Left Ear: External ear normal.   Neck: Carotid bruit is not present.   Normal range of motion.  Cardiovascular:  Normal rate, regular rhythm, normal heart sounds and intact distal pulses.     Exam reveals no gallop and no friction rub.       No murmur heard.  Pulmonary/Chest: Breath sounds normal. No respiratory distress. She has no wheezes. She has no rhonchi. She has no rales.   Abdominal: Abdomen is soft. Bowel sounds are normal. She exhibits no distension. There is no abdominal tenderness. There is no rebound and no guarding.   Musculoskeletal:         General: Normal range of motion.      Cervical back: Normal range of motion.      Comments: There is normal range of motion of the left index finger.  There is an approximately 2 cm laceration.  No tendon or nailbed injury.  Neurovascularly intact.     Neurological: She is alert and oriented to person, place, and time. GCS score is 15. GCS eye subscore is 4. GCS verbal subscore is 5. GCS motor subscore is 6.   Psychiatric: She has a normal mood and affect.         ED Course   Lac Repair    Date/Time: 2/7/2024 10:29 PM    Performed by: Oumar West PA-C  Authorized by: Jian Coughlin MD    Consent:     Consent obtained:  Verbal    Consent given by:  Patient    Risks discussed:  Infection, pain, poor cosmetic result and poor wound healing  Universal protocol:     Patient identity confirmed:  Verbally with patient and arm band  Anesthesia:     Anesthesia method:  Local infiltration    Local anesthetic:  Lidocaine 1% w/o epi  Laceration details:     Location:  Finger    Finger location:  L index finger    Length (cm):   2  Exploration:     Imaging obtained: x-ray      Imaging outcome: foreign body noted    Treatment:     Area cleansed with:  Povidone-iodine    Amount of cleaning:  Standard    Irrigation solution:  Sterile saline    Debridement:  None  Skin repair:     Repair method:  Sutures    Suture size:  5-0    Suture material:  Nylon    Suture technique:  Simple interrupted    Number of sutures:  3  Approximation:     Approximation:  Close  Repair type:     Repair type:  Simple  Post-procedure details:     Dressing:  Non-adherent dressing    Procedure completion:  Tolerated    Labs Reviewed - No data to display       Imaging Results              X-Ray Finger 2 or More Views Left (Final result)  Result time 02/07/24 21:59:39      Final result by Christine De Leon MD (02/07/24 21:59:39)                   Impression:      See above.      Electronically signed by: Christine De Leon MD  Date:    02/07/2024  Time:    21:59               Narrative:    EXAMINATION:  XR FINGER 2 OR MORE VIEWS LEFT    CLINICAL HISTORY:  left finger pain;    TECHNIQUE:  Left index finger three views.    COMPARISON:  None    FINDINGS:  No evidence of acute displaced fracture, dislocation, or osseous destructive process.    Laceration is seen at the proximal aspect of the index finger.  Few punctate radiopaque densities are seen in the region.  Uncertain if these may be along the skin surface or possibly representing small retained foreign bodies.                                       Medications   LIDOcaine HCL 10 mg/ml (1%) injection 10 mL (10 mLs Infiltration Given by Provider 2/7/24 9260)     Medical Decision Making  This is an emergent evaluation of a 24-year-old female who presents to the emergency department for evaluation of laceration to her left index finger that occurred prior to arrival.  On physical exam, patient is well-appearing and in no acute distress. There is normal range of motion of the left index finger.  There is an approximately 2 cm  laceration.  No tendon or nailbed injury.  Neurovascularly intact. Regular rate rhythm without murmurs.  No carotid bruits appreciated on exam. Lungs are clear to auscultation bilaterally.  Abdomen is soft, nontender, non distended, with normal bowel sounds.  Differential diagnosis includes but is not limited to laceration, cellulitis, open fracture, tendon injury, nailbed injury.  Workup initiated with x-ray of left finger two-view.  Ordered laceration kit and lidocaine.  X-ray shows laceration is seen at the proximal aspect of the index finger.  Few punctate radiopaque densities are seen in the region.  Uncertain if these may be along the skin surface or possibly representing small retained foreign bodies.  Laceration repair completed by PA student and myself.  Patient tolerated procedure well without difficulty.  Three sutures were placed.  Informed patient that sutures would need to be removed in 1-2 weeks.  Will send home with a course of mupirocin ointment.  Wound care instructions provided. Patient is very well appearing, and in no acute distress. Vital signs are reassuring here in the emergency department, patient is afebrile, breathing comfortable, satting 100 % on room air. Patient/Caregiver is stable for discharge at this time. Patient/Caregiver verbalize understanding of care plan. All questions and concerns were addressed. Discussed strict return precautions with the patient/caregiver. Instructed follow up with primary care provider within 1 week.      Oumar West PA-C    DISCLAIMER: This note was prepared with [employer] voice recognition transcription software. Garbled syntax, mangled pronouns, and other bizarre constructions may be attributed to that software system.     Amount and/or Complexity of Data Reviewed  Radiology: ordered.    Risk  Prescription drug management.                                      Clinical Impression:  Final diagnoses:  [S27.350G] Laceration of left index finger without  foreign body without damage to nail, initial encounter (Primary)          ED Disposition Condition    Discharge Stable          ED Prescriptions       Medication Sig Dispense Start Date End Date Auth. Provider    mupirocin (BACTROBAN) 2 % ointment Apply topically 3 (three) times daily. 15 g 2/7/2024 -- Oumar West PA-C          Follow-up Information       Follow up With Specialties Details Why Contact Info    [facility] Emergency Medicine Go to  As needed, If symptoms worsen, or new symptoms develop [address] [facility] [id number] [phone]             Oumar West PA-C  02/07/24 6046

## 2024-10-07 ENCOUNTER — HOSPITAL ENCOUNTER (EMERGENCY)
Facility: HOSPITAL | Age: 24
Discharge: HOME | End: 2024-10-07
Attending: EMERGENCY MEDICINE
Payer: COMMERCIAL

## 2024-10-07 VITALS
BODY MASS INDEX: 28.47 KG/M2 | SYSTOLIC BLOOD PRESSURE: 119 MMHG | HEART RATE: 99 BPM | RESPIRATION RATE: 17 BRPM | HEIGHT: 63 IN | OXYGEN SATURATION: 99 % | DIASTOLIC BLOOD PRESSURE: 72 MMHG | TEMPERATURE: 98 F | WEIGHT: 160.69 LBS

## 2024-10-07 DIAGNOSIS — S16.1XXA STRAIN OF NECK MUSCLE, INITIAL ENCOUNTER: ICD-10-CM
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
## 2024-10-07 DIAGNOSIS — S89.92XA INJURY OF LEFT KNEE, INITIAL ENCOUNTER: ICD-10-CM

## 2024-10-07 LAB
B-HCG UR QL: NEGATIVE
CTP QC/QA: YES

## 2024-10-07 PROCEDURE — 63600175 PHARM REV CODE 636 W HCPCS: Mod: ER

## 2024-10-07 PROCEDURE — 96372 THER/PROPH/DIAG INJ SC/IM: CPT

## 2024-10-07 PROCEDURE — 81025 URINE PREGNANCY TEST: CPT | Mod: ER | Performed by: EMERGENCY MEDICINE

## 2024-10-07 PROCEDURE — 99284 EMERGENCY DEPT VISIT MOD MDM: CPT | Mod: 25,ER

## 2024-10-07 PROCEDURE — 81025 URINE PREGNANCY TEST: CPT | Mod: ER

## 2024-10-07 PROCEDURE — 25000003 PHARM REV CODE 250: Mod: ER

## 2024-10-07 RX ORDER — METHOCARBAMOL 500 MG/1
1000 TABLET, FILM COATED ORAL
Status: COMPLETED | OUTPATIENT
Start: 2024-10-07 | End: 2024-10-07

## 2024-10-07 RX ORDER — METHOCARBAMOL 500 MG/1
1000 TABLET, FILM COATED ORAL 3 TIMES DAILY
Qty: 30 TABLET | Refills: 0 | Status: SHIPPED | OUTPATIENT
Start: 2024-10-07 | End: 2024-10-12

## 2024-10-07 RX ORDER — KETOROLAC TROMETHAMINE 30 MG/ML
30 INJECTION, SOLUTION INTRAMUSCULAR; INTRAVENOUS
Status: COMPLETED | OUTPATIENT
Start: 2024-10-07 | End: 2024-10-07

## 2024-10-07 RX ORDER — NAPROXEN 500 MG/1
500 TABLET ORAL 2 TIMES DAILY WITH MEALS
Qty: 10 TABLET | Refills: 0 | Status: SHIPPED | OUTPATIENT
Start: 2024-10-07 | End: 2024-10-12

## 2024-10-07 RX ADMIN — KETOROLAC TROMETHAMINE 30 MG: 30 INJECTION, SOLUTION INTRAMUSCULAR at 10:10

## 2024-10-07 RX ADMIN — METHOCARBAMOL 1000 MG: 500 TABLET ORAL at 10:10

## 2024-10-07 NOTE — Clinical Note
"Doroteo Mortensen" Landy was seen and treated in our emergency department on 10/7/2024.  She may return to work on 10/09/2024.       If you have any questions or concerns, please don't hesitate to call.      Noel Villarreal, OSCAR"

## 2024-10-08 NOTE — DISCHARGE INSTRUCTIONS
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
## 2025-05-10 ENCOUNTER — HOSPITAL ENCOUNTER (EMERGENCY)
Facility: HOSPITAL | Age: 25
Discharge: HOME | End: 2025-05-10
Attending: EMERGENCY MEDICINE
Payer: MEDICAID

## 2025-05-10 VITALS
TEMPERATURE: 99 F | WEIGHT: 159 LBS | SYSTOLIC BLOOD PRESSURE: 114 MMHG | HEIGHT: 63 IN | HEART RATE: 109 BPM | RESPIRATION RATE: 18 BRPM | BODY MASS INDEX: 28.17 KG/M2 | DIASTOLIC BLOOD PRESSURE: 70 MMHG | OXYGEN SATURATION: 98 %

## 2025-05-10 DIAGNOSIS — S61.219A FINGER LACERATION: ICD-10-CM

## 2025-05-10 LAB
B-HCG UR QL: NEGATIVE
CTP QC/QA: YES

## 2025-05-10 PROCEDURE — 99284 EMERGENCY DEPT VISIT MOD MDM: CPT | Mod: 25,ER

## 2025-05-10 PROCEDURE — 81025 URINE PREGNANCY TEST: CPT | Mod: ER | Performed by: EMERGENCY MEDICINE

## 2025-05-10 PROCEDURE — 12001 RPR S/N/AX/GEN/TRNK 2.5CM/<: CPT | Mod: ER

## 2025-05-10 PROCEDURE — 63600175 PHARM REV CODE 636 W HCPCS: Mod: ER | Performed by: PHYSICIAN ASSISTANT

## 2025-05-10 PROCEDURE — 25000003 PHARM REV CODE 250: Mod: ER | Performed by: PHYSICIAN ASSISTANT

## 2025-05-10 RX ORDER — NAPROXEN 250 MG/1
500 TABLET ORAL
Status: COMPLETED | OUTPATIENT
Start: 2025-05-10 | End: 2025-05-10

## 2025-05-10 RX ORDER — LIDOCAINE HYDROCHLORIDE 10 MG/ML
10 INJECTION, SOLUTION INFILTRATION; PERINEURAL
Status: COMPLETED | OUTPATIENT
Start: 2025-05-10 | End: 2025-05-10

## 2025-05-10 RX ORDER — MUPIROCIN 20 MG/G
OINTMENT TOPICAL 3 TIMES DAILY
Qty: 15 G | Refills: 0 | Status: SHIPPED | OUTPATIENT
Start: 2025-05-10 | End: 2025-05-17

## 2025-05-10 RX ORDER — NAPROXEN 500 MG/1
500 TABLET ORAL 2 TIMES DAILY WITH MEALS
Qty: 30 TABLET | Refills: 0 | Status: SHIPPED | OUTPATIENT
Start: 2025-05-10

## 2025-05-10 RX ADMIN — NAPROXEN 500 MG: 250 TABLET ORAL at 11:05

## 2025-05-10 RX ADMIN — LIDOCAINE HYDROCHLORIDE 10 ML: 10 INJECTION, SOLUTION INFILTRATION; PERINEURAL at 11:05

## 2025-05-10 NOTE — ED NOTES
Pt not present in room. Pt left without finger splint and dc paperwork. ED OC and provider notified.

## 2025-05-10 NOTE — ED PROVIDER NOTES
Encounter Date: 5/10/2025       History     Chief Complaint   Patient presents with    Laceration     A 24 y/o female presents to the ER c/o laceration (right) middle and ring finger 5 minutes PTA.  Bleeding controlled  Pt reports cooking at time of incident.   Pulse present     Chief complaint: Laceration    HPI:     25-year-old female with history of hypertension, renal disorder presenting for evaluation of lacerations to the index and middle finger of the right hand after the patient was using a knife to skin fish prior to arrival.  She reports associated 8/10 pain.  No attempted treatment prior to arrival.    She reports her tetanus is up-to-date.  Denies weakness paresthesias.        Review of patient's allergies indicates:   Allergen Reactions    Aspirin Other (See Comments)     Reports medication makes her weak and light headed    Ibuprofen Other (See Comments)     Reports medication makes her weak and light headed     Tylenol [acetaminophen] Other (See Comments)     Reports medication makes her weak and light headed      Past Medical History:   Diagnosis Date    Hypertension     Proteinuria     Renal disorder     Seasonal allergies      Past Surgical History:   Procedure Laterality Date    MIDDLE EAR SURGERY      RENAL BIOPSY      TYMPANOSTOMY TUBE PLACEMENT       No family history on file.  Social History[1]  Review of Systems   Constitutional:  Negative for chills and fever.   HENT:  Negative for congestion, ear pain, nosebleeds, rhinorrhea, sore throat and trouble swallowing.    Eyes:  Negative for redness.   Respiratory:  Negative for cough, shortness of breath and stridor.    Cardiovascular:  Negative for chest pain.   Gastrointestinal:  Negative for abdominal pain, constipation, diarrhea, nausea and vomiting.   Genitourinary:  Negative for decreased urine volume, dysuria, frequency, hematuria and urgency.   Musculoskeletal:  Negative for back pain and neck pain.   Skin:  Positive for wound. Negative  for rash.   Neurological:  Negative for dizziness, speech difficulty, weakness, light-headedness, numbness and headaches.   Psychiatric/Behavioral:  Negative for confusion.        Physical Exam     Initial Vitals [05/10/25 0938]   BP Pulse Resp Temp SpO2   114/70 109 18 98.6 °F (37 °C) 98 %      MAP       --         Physical Exam    Nursing note and vitals reviewed.  Constitutional: She appears well-developed and well-nourished. No distress.   HENT:   Head: Normocephalic.   Right Ear: External ear normal.   Left Ear: External ear normal.   Eyes: Conjunctivae are normal. Right eye exhibits no discharge. Left eye exhibits no discharge. No scleral icterus.   Neck: No tracheal deviation present.   Pulmonary/Chest: No stridor. No respiratory distress.   Musculoskeletal:         General: Normal range of motion.     Neurological: She is alert.   Skin: Skin is warm and dry. No rash noted. No erythema.   Psychiatric: She has a normal mood and affect. Her behavior is normal. Judgment and thought content normal.         ED Course   Lac Repair    Date/Time: 5/10/2025 12:35 PM    Performed by: Lina Bailey PA-C  Authorized by: Aliza Hinton DO    Consent:     Consent obtained:  Verbal    Consent given by:  Patient  Anesthesia:     Anesthesia method:  Local infiltration    Local anesthetic:  Lidocaine 1% w/o epi  Laceration details:     Location:  Finger    Finger location:  R ring finger    Length (cm):  1  Exploration:     Hemostasis achieved with:  Direct pressure    Imaging obtained: x-ray      Imaging outcome: foreign body not noted    Treatment:     Area cleansed with:  Saline    Amount of cleaning:  Standard  Skin repair:     Repair method:  Sutures    Suture size:  4-0    Suture material:  Nylon    Suture technique:  Simple interrupted    Number of sutures:  3  Approximation:     Approximation:  Close  Repair type:     Repair type:  Simple  Post-procedure details:     Dressing:  Open (no dressing)    Procedure  completion:  Tolerated  Lac Repair    Date/Time: 5/10/2025 12:36 PM    Performed by: Lina Bailey PA-C  Authorized by: Aliza Hinton DO    Consent:     Consent obtained:  Verbal    Consent given by:  Patient    Risks, benefits, and alternatives were discussed: yes    Laceration details:     Length (cm):  0.5  Pre-procedure details:     Preparation:  Patient was prepped and draped in usual sterile fashion  Exploration:     Hemostasis achieved with:  Direct pressure  Treatment:     Irrigation solution:  Sterile saline  Skin repair:     Repair method:  Tissue adhesive  Approximation:     Approximation:  Close  Post-procedure details:     Procedure completion:  Tolerated    Labs Reviewed   POCT URINE PREGNANCY       Result Value    POC Preg Test, Ur Negative       Acceptable Yes            Imaging Results              X-Ray Hand 3 view Right (Final result)  Result time 05/10/25 11:17:27   Procedure changed from X-Ray Hand 2 View Right     Final result by Phoenix Santoyo MD (05/10/25 11:17:27)                   Impression:      As above      Electronically signed by: Phoenix Santoyo MD  Date:    05/10/2025  Time:    11:17               Narrative:    EXAMINATION:  XR HAND COMPLETE 3 VIEW RIGHT    CLINICAL HISTORY:  pain; Laceration without foreign body of unspecified finger without damage to nail, initial encounter    TECHNIQUE:  PA, lateral, and oblique views of the right hand were performed.    COMPARISON:  None    FINDINGS:  No acute osseous or soft tissue abnormality appreciated.  No radiopaque foreign body.                                       Medications   naproxen tablet 500 mg (500 mg Oral Given 5/10/25 1124)   LIDOcaine HCL 10 mg/ml (1%) injection 10 mL (10 mLs Intradermal Given 5/10/25 1140)     Medical Decision Making  24 y/o F presenting for evaluation of R finger lacerations. Exam above.   X-ray negative for fracture dislocation or bony abnormality. Neurovascularly intact.  No evidence of tendon injury.     0935 pt arrived in ED I went out to assess a patient that had been here 13 minutes longer than the pt intending to assess her immediately after. I heard her muttering and upset. Unfortunately there were no rooms to move pt into for lac repair. She was roomed at 1117. I went in to see the patient and after seeing her, I left the room and at 1125 lidocaine was ordered, 1140 meds pulled  Pt seemed upset initially when I was attempting her lac repair due to the wait time today and another patient of higher acuity being pulled back before her for evaluation . Lac repaired and pt dispoed at 1206. I anticipate pt complaint however I cannot think of anything I can do to further appease the pt at this time. Once roomed she was dispoed in less than 50 mins.  She left without finger splint placement in stable condition despite splint being ordered. Naproxen and bactroban sent to her pharmacy    Amount and/or Complexity of Data Reviewed  Labs: ordered.  Radiology: ordered.    Risk  Prescription drug management.                                      Clinical Impression:  Final diagnoses:  [S61.219A] Finger laceration          ED Disposition Condition    Discharge Stable          ED Prescriptions       Medication Sig Dispense Start Date End Date Auth. Provider    naproxen (NAPROSYN) 500 MG tablet Take 1 tablet (500 mg total) by mouth 2 (two) times daily with meals. 30 tablet 5/10/2025 -- Lina Bailey PA-C    mupirocin (BACTROBAN) 2 % ointment Apply topically 3 (three) times daily. for 7 days 15 g 5/10/2025 5/17/2025 Lina Bailey PA-C          Follow-up Information       Follow up With Specialties Details Why Contact Info    Your Primary Care Doctor  Schedule an appointment as soon as possible for a visit today For suture removal 5-7 days     [facility] ED Emergency Medicine Go to  As needed, If symptoms worsen [address] [facility]  [id number]  [phone]                 [1]   Social History  Tobacco Use    Smoking status: Every Day     Types: Vaping with nicotine    Smokeless tobacco: Never   Substance Use Topics    Alcohol use: No    Drug use: Never        Lina Bailey PA-C  05/10/25 1523

## 2025-05-10 NOTE — DISCHARGE INSTRUCTIONS

## 2025-05-10 NOTE — ED NOTES
Urine collected for UPT. The nurse explained to the patient the reason for the collection for the UPT. Specimen sent to lab.

## 2025-05-10 NOTE — ED NOTES
Pt cut her hand attempting to skin salmon at home. right hand 3rd and 4th finger lacerations. Wound cleaned by provider with soap and betadine. lac tray to BS.